# Patient Record
Sex: MALE | Race: WHITE | NOT HISPANIC OR LATINO | Employment: UNEMPLOYED | URBAN - METROPOLITAN AREA
[De-identification: names, ages, dates, MRNs, and addresses within clinical notes are randomized per-mention and may not be internally consistent; named-entity substitution may affect disease eponyms.]

---
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
## 2021-04-20 ENCOUNTER — TELEPHONE (OUTPATIENT)
Dept: OTHER | Facility: OTHER | Age: 21
End: 2021-04-20

## 2021-04-30 ENCOUNTER — IMMUNIZATIONS (OUTPATIENT)
Dept: FAMILY MEDICINE CLINIC | Facility: HOSPITAL | Age: 21
End: 2021-04-30

## 2021-04-30 DIAGNOSIS — Z23 ENCOUNTER FOR IMMUNIZATION: Primary | ICD-10-CM

## 2021-04-30 PROCEDURE — 0001A SARS-COV-2 / COVID-19 MRNA VACCINE (PFIZER-BIONTECH) 30 MCG: CPT

## 2021-04-30 PROCEDURE — 91300 SARS-COV-2 / COVID-19 MRNA VACCINE (PFIZER-BIONTECH) 30 MCG: CPT

## 2021-05-26 ENCOUNTER — IMMUNIZATIONS (OUTPATIENT)
Dept: FAMILY MEDICINE CLINIC | Facility: HOSPITAL | Age: 21
End: 2021-05-26

## 2021-05-26 DIAGNOSIS — Z23 ENCOUNTER FOR IMMUNIZATION: Primary | ICD-10-CM

## 2021-05-26 PROCEDURE — 0002A SARS-COV-2 / COVID-19 MRNA VACCINE (PFIZER-BIONTECH) 30 MCG: CPT

## 2021-05-26 PROCEDURE — 91300 SARS-COV-2 / COVID-19 MRNA VACCINE (PFIZER-BIONTECH) 30 MCG: CPT

## 2022-02-12 ENCOUNTER — IMMUNIZATIONS (OUTPATIENT)
Dept: FAMILY MEDICINE CLINIC | Facility: HOSPITAL | Age: 22
End: 2022-02-12

## 2022-02-12 PROCEDURE — 0051A COVID-19 PFIZER VACC TRIS-SUCROSE GRAY CAP 0.3 ML: CPT

## 2022-02-12 PROCEDURE — 91305 COVID-19 PFIZER VACC TRIS-SUCROSE GRAY CAP 0.3 ML: CPT

## 2022-02-24 ENCOUNTER — OFFICE VISIT (OUTPATIENT)
Dept: FAMILY MEDICINE CLINIC | Facility: CLINIC | Age: 22
End: 2022-02-24
Payer: COMMERCIAL

## 2022-02-24 VITALS
BODY MASS INDEX: 19.62 KG/M2 | RESPIRATION RATE: 16 BRPM | WEIGHT: 125 LBS | HEIGHT: 67 IN | HEART RATE: 85 BPM | OXYGEN SATURATION: 99 % | TEMPERATURE: 99.1 F | SYSTOLIC BLOOD PRESSURE: 108 MMHG | DIASTOLIC BLOOD PRESSURE: 72 MMHG

## 2022-02-24 DIAGNOSIS — Z00.00 ANNUAL PHYSICAL EXAM: Primary | ICD-10-CM

## 2022-02-24 DIAGNOSIS — J45.30 MILD PERSISTENT ASTHMA WITHOUT COMPLICATION: ICD-10-CM

## 2022-02-24 DIAGNOSIS — N50.812 TESTICULAR PAIN, LEFT: ICD-10-CM

## 2022-02-24 DIAGNOSIS — R07.9 CHEST PAIN, UNSPECIFIED TYPE: ICD-10-CM

## 2022-02-24 DIAGNOSIS — Z13.220 SCREENING, LIPID: ICD-10-CM

## 2022-02-24 DIAGNOSIS — Z13.21 ENCOUNTER FOR VITAMIN DEFICIENCY SCREENING: ICD-10-CM

## 2022-02-24 DIAGNOSIS — Z13.89 SCREENING FOR GENITOURINARY CONDITION: ICD-10-CM

## 2022-02-24 DIAGNOSIS — Z13.29 SCREENING FOR THYROID DISORDER: ICD-10-CM

## 2022-02-24 DIAGNOSIS — Z13.1 SCREENING FOR DIABETES MELLITUS: ICD-10-CM

## 2022-02-24 PROCEDURE — 1036F TOBACCO NON-USER: CPT | Performed by: INTERNAL MEDICINE

## 2022-02-24 PROCEDURE — 3008F BODY MASS INDEX DOCD: CPT | Performed by: INTERNAL MEDICINE

## 2022-02-24 PROCEDURE — 99395 PREV VISIT EST AGE 18-39: CPT | Performed by: INTERNAL MEDICINE

## 2022-02-24 NOTE — PATIENT INSTRUCTIONS

## 2022-02-24 NOTE — PROGRESS NOTES
ADULT ANNUAL [address] [facility] PRIMARY CARE [facility]    NAME: Isabel Ivey  AGE: 22 y o  SEX: male  : 2000     DATE: 2022     Assessment and Plan:     Problem List Items Addressed This Visit        Respiratory    Asthma, mild persistent     Stable p r n  Albuterol chest x-ray ordered           Other Visit Diagnoses     Annual physical exam    -  Primary    Testicular pain, left        Relevant Orders    US scrotum and groin area    Chlamydia/GC amplified DNA by PCR    Chest pain, unspecified type        Relevant Orders    XR chest pa & lateral    Screening, lipid        Relevant Orders    Lipid panel    Screening for thyroid disorder        Relevant Orders    TSH, 3rd generation    Encounter for vitamin deficiency screening        Relevant Orders    Vitamin D Panel    Screening for genitourinary condition        Relevant Orders    UA (URINE) with reflex to Scope    Screening for diabetes mellitus        Relevant Orders    Comprehensive metabolic panel    CBC and differential          Immunizations and preventive care screenings were discussed with patient today  Appropriate education was printed on patient's after visit summary  Counseling:  · Smoking         No follow-ups on file  Chief Complaint:     Chief Complaint   Patient presents with    Annual Physical    Groin Pain     hurts to touch     Chest pains     Current smoker     burning while peeing      History of Present Illness:     Adult Annual Physical   Patient here for a comprehensive physical exam  The patient reports problems - See below  Patient reports having intermittent left testicular discomfort associated with some burning with urination going on for last 2 months  Not been anybody new  Denies any penile discharge  Denies any fevers and chills  Denies any trauma  Currently his not having any discomfort    Is also experiencing some chest discomfort and quit smoking several months ago  He now is smoking marijuana  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 [facility]  Diet and Physical Activity  · Diet/Nutrition: regular  · Exercise: Active lifestyle  Depression Screening  PHQ-2/9 Depression Screening         General Health  · Sleep: sleeps well  · Hearing: normal - bilateral   · Vision: no vision problems  · Dental: regular dental visits  [facility]  · History of STDs?: no   Not known   Review of Systems:     Review of Systems   Constitutional: Negative for chills and fever  Respiratory:        As above   Cardiovascular:        As above   Gastrointestinal: Negative for abdominal pain, blood in stool, constipation and diarrhea  Genitourinary:        As above   Psychiatric/Behavioral:        Reports his mood to be stable  Not currently any medication      Past Medical History:     Past Medical History:   Diagnosis Date    ADHD (attention deficit hyperactivity disorder)     Allergic       Past Surgical History:     History reviewed  No pertinent surgical history     Social History:     Social History     Socioeconomic History    Marital status: Single     Spouse name: None    Number of children: None    Years of education: None    Highest education level: None   Occupational History    None   Tobacco Use    Smoking status: Former Smoker     Types: Cigarettes     Quit date: 2022     Years since quittin 0    Smokeless tobacco: Never Used   Vaping Use    Vaping Use: None   Substance and Sexual Activity    Alcohol use: None    Drug use: Yes     Types: Marijuana     Comment: Everyday     Sexual activity: None   Other Topics Concern    None   Social History Narrative    None     Social Determinants of Health     Financial Resource Strain: Not on file   Food Insecurity: Not on file   Transportation Needs: Not on file   Physical Activity: Not on file   Stress: Not on file   Social Connections: Not on file Intimate Partner Violence: Not on file   Housing Stability: Not on file      Family History:     Family History   Problem Relation Age of Onset    Diabetes Mother     Kidney failure Mother     Blindness Mother     Depression Mother     No Known Problems Father     Depression Maternal Grandmother     Substance Abuse Neg Hx       Current Medications:     Current Outpatient Medications   Medication Sig Dispense Refill    albuterol (PROAIR HFA) 90 mcg/act inhaler Inhale 2 puffs 4 (four) times a day 1 Inhaler 3    loratadine (CLARITIN) 10 mg tablet Take 1 tablet by mouth daily      mometasone (NASONEX) 50 mcg/act nasal spray 2 sprays into each nostril daily      montelukast (SINGULAIR) 10 mg tablet Take 1 tablet (10 mg total) by mouth daily (Patient not taking: Reported on 2/24/2022 ) 30 tablet 3    sertraline (ZOLOFT) 25 mg tablet TAKE 1 TABLET BY MOUTH EVERY DAY (Patient not taking: Reported on 2/24/2022) 90 tablet 1     No current facility-administered medications for this visit  Allergies: Allergies   Allergen Reactions    Sulfa Antibiotics Anaphylaxis, Hives and Vomiting    Pollen Extract      Annotation - [id number]: runny nose, congestion      Physical Exam:     /72 (BP Location: Left arm, Patient Position: Sitting, Cuff Size: Standard)   Pulse 85   Temp 99 1 °F (37 3 °C) (Tympanic)   Resp 16   Ht 5' 7" (1 702 m)   Wt 56 7 kg (125 lb)   SpO2 99%   BMI 19 58 kg/m²     Physical Exam  Vitals and nursing note reviewed  Constitutional:       Appearance: He is well-developed  HENT:      Head: Normocephalic and atraumatic  Eyes:      Conjunctiva/sclera: Conjunctivae normal    Cardiovascular:      Rate and Rhythm: Normal rate and regular rhythm  Heart sounds: No murmur heard  Pulmonary:      Effort: Pulmonary effort is normal  No respiratory distress  Breath sounds: Normal breath sounds  Abdominal:      General: Bowel sounds are normal  There is no distension  Palpations: Abdomen is soft  Tenderness: There is no abdominal tenderness  There is no right CVA tenderness, left CVA tenderness or guarding  Genitourinary:     Penis: Normal        Testes: Normal       Comments: No hernias appreciated  Musculoskeletal:      Cervical back: Neck supple  Skin:     General: Skin is warm and dry  Neurological:      General: No focal deficit present  Mental Status: He is alert and oriented to person, place, and time     Psychiatric:         Mood and Affect: Mood normal           Bev Smith MD   [address]

## 2022-03-08 ENCOUNTER — OFFICE VISIT (OUTPATIENT)
Dept: FAMILY MEDICINE CLINIC | Facility: CLINIC | Age: 22
End: 2022-03-08
Payer: COMMERCIAL

## 2022-03-08 VITALS
OXYGEN SATURATION: 99 % | HEART RATE: 84 BPM | DIASTOLIC BLOOD PRESSURE: 68 MMHG | TEMPERATURE: 100.1 F | BODY MASS INDEX: 20.09 KG/M2 | HEIGHT: 67 IN | SYSTOLIC BLOOD PRESSURE: 126 MMHG | WEIGHT: 128 LBS

## 2022-03-08 DIAGNOSIS — Z20.2 EXPOSURE TO STD: ICD-10-CM

## 2022-03-08 DIAGNOSIS — Z11.4 SCREENING FOR HIV (HUMAN IMMUNODEFICIENCY VIRUS): Primary | ICD-10-CM

## 2022-03-08 DIAGNOSIS — Z20.2 EXPOSURE TO STD: Primary | ICD-10-CM

## 2022-03-08 DIAGNOSIS — N50.812 TESTICULAR PAIN, LEFT: ICD-10-CM

## 2022-03-08 DIAGNOSIS — R30.0 DYSURIA: ICD-10-CM

## 2022-03-08 DIAGNOSIS — Z11.59 NEED FOR HEPATITIS C SCREENING TEST: ICD-10-CM

## 2022-03-08 LAB
SL AMB  POCT GLUCOSE, UA: ABNORMAL
SL AMB LEUKOCYTE ESTERASE,UA: ABNORMAL
SL AMB POCT BILIRUBIN,UA: ABNORMAL
SL AMB POCT BLOOD,UA: POSITIVE
SL AMB POCT CLARITY,UA: CLEAR
SL AMB POCT COLOR,UA: YELLOW
SL AMB POCT KETONES,UA: ABNORMAL
SL AMB POCT NITRITE,UA: ABNORMAL
SL AMB POCT PH,UA: POSITIVE
SL AMB POCT SPECIFIC GRAVITY,UA: POSITIVE
SL AMB POCT URINE PROTEIN: POSITIVE
SL AMB POCT UROBILINOGEN: ABNORMAL

## 2022-03-08 PROCEDURE — 81002 URINALYSIS NONAUTO W/O SCOPE: CPT | Performed by: INTERNAL MEDICINE

## 2022-03-08 PROCEDURE — 3008F BODY MASS INDEX DOCD: CPT | Performed by: INTERNAL MEDICINE

## 2022-03-08 PROCEDURE — 87591 N.GONORRHOEAE DNA AMP PROB: CPT | Performed by: INTERNAL MEDICINE

## 2022-03-08 PROCEDURE — 99213 OFFICE O/P EST LOW 20 MIN: CPT | Performed by: INTERNAL MEDICINE

## 2022-03-08 PROCEDURE — 1036F TOBACCO NON-USER: CPT | Performed by: INTERNAL MEDICINE

## 2022-03-08 PROCEDURE — 3725F SCREEN DEPRESSION PERFORMED: CPT | Performed by: INTERNAL MEDICINE

## 2022-03-08 PROCEDURE — 87491 CHLMYD TRACH DNA AMP PROBE: CPT | Performed by: INTERNAL MEDICINE

## 2022-03-08 RX ORDER — LEVOFLOXACIN 500 MG/1
500 TABLET, FILM COATED ORAL EVERY 24 HOURS
Qty: 1 TABLET | Refills: 0 | Status: SHIPPED | OUTPATIENT
Start: 2022-03-08 | End: 2022-03-09

## 2022-03-08 RX ORDER — CIPROFLOXACIN 500 MG/1
500 TABLET, FILM COATED ORAL DAILY
Qty: 1 TABLET | Refills: 0 | Status: SHIPPED | OUTPATIENT
Start: 2022-03-08 | End: 2022-03-08 | Stop reason: RX

## 2022-03-08 RX ORDER — DOXYCYCLINE HYCLATE 100 MG
100 TABLET ORAL 2 TIMES DAILY
Qty: 14 TABLET | Refills: 0 | Status: SHIPPED | OUTPATIENT
Start: 2022-03-08 | End: 2022-03-15

## 2022-03-08 NOTE — ADDENDUM NOTE
Addended by: Kimberli Whipple on: 3/8/2022 04:42 PM     Modules accepted: Orders Scalpel Size: 15 blade

## 2022-03-08 NOTE — PROGRESS NOTES
Assessment/Plan:           Problem List Items Addressed This Visit     None      Visit Diagnoses     Screening for HIV (human immunodeficiency virus)    -  Primary    Relevant Orders    HIV 1/2 Antigen/Antibody (4th Generation) w Reflex [facility]    Dysuria        Testicular pain, left        Relevant Orders    US scrotum and testicles    Exposure to STD        Relevant Medications    ciprofloxacin (CIPRO) 500 mg tablet    doxycycline hyclate (VIBRA-TABS) 100 mg tablet    Need for hepatitis C screening test        Relevant Orders    Hepatitis C Antibody ([facility])            Subjective:      Patient ID: Cleopatra De Leon is a 22 y o  male  HPI  Patient is here for an acute visit complaining of dysuria is been going on for several days now  Patient was here couple of weeks in labs and scrotal ultrasound was ordered which patient has not carried out stating that he is to be seen  Urine dip in the office shows 1+ blood with no LE or nitrates  Patient does admit to having unprotected sex recently  We would in the urine out for GC and chlamydia  I will start antibiotic  Testicular ultrasound will be scheduled for him  Patient will go for lab studies tomorrow  The following portions of the patient's history were reviewed and updated as appropriate: allergies, current medications, past family history, past medical history, past social history, past surgical history and problem list     Review of Systems      Objective:      /68 (BP Location: Left arm, Patient Position: Sitting, Cuff Size: Standard)   Pulse 84   Temp 100 1 °F (37 8 °C) (Tympanic)   Ht 5' 7" (1 702 m)   Wt 58 1 kg (128 lb)   SpO2 99%   BMI 20 05 kg/m²          Physical Exam  Abdominal:      Tenderness: There is no right CVA tenderness or left CVA tenderness

## 2022-03-09 ENCOUNTER — TELEPHONE (OUTPATIENT)
Dept: FAMILY MEDICINE CLINIC | Facility: CLINIC | Age: 22
End: 2022-03-09

## 2022-03-09 RX ORDER — CIPROFLOXACIN 500 MG/1
500 TABLET, FILM COATED ORAL DAILY
Qty: 1 TABLET | Refills: 0 | Status: SHIPPED | OUTPATIENT
Start: 2022-03-09 | End: 2022-03-09

## 2022-03-09 NOTE — TELEPHONE ENCOUNTER
Pt  Called asking about the CIPRO medication asking if he should be filling it or not since he received a call from the pharmacy

## 2022-03-10 LAB
C TRACH DNA SPEC QL NAA+PROBE: NEGATIVE
N GONORRHOEA DNA SPEC QL NAA+PROBE: NEGATIVE

## 2022-08-08 ENCOUNTER — OFFICE VISIT (OUTPATIENT)
Dept: FAMILY MEDICINE CLINIC | Facility: CLINIC | Age: 22
End: 2022-08-08
Payer: COMMERCIAL

## 2022-08-08 ENCOUNTER — TELEPHONE (OUTPATIENT)
Dept: FAMILY MEDICINE CLINIC | Facility: CLINIC | Age: 22
End: 2022-08-08

## 2022-08-08 VITALS
BODY MASS INDEX: 19.74 KG/M2 | DIASTOLIC BLOOD PRESSURE: 68 MMHG | RESPIRATION RATE: 16 BRPM | HEIGHT: 67 IN | TEMPERATURE: 98.1 F | SYSTOLIC BLOOD PRESSURE: 122 MMHG | WEIGHT: 125.8 LBS | HEART RATE: 104 BPM | OXYGEN SATURATION: 98 %

## 2022-08-08 DIAGNOSIS — H92.03 OTALGIA OF BOTH EARS: Primary | ICD-10-CM

## 2022-08-08 DIAGNOSIS — R07.0 THROAT PAIN: ICD-10-CM

## 2022-08-08 DIAGNOSIS — D23.4 CYST, DERMOID, SCALP AND NECK: ICD-10-CM

## 2022-08-08 PROBLEM — Z87.891 FORMER SMOKER: Status: ACTIVE | Noted: 2022-08-08

## 2022-08-08 PROCEDURE — 3725F SCREEN DEPRESSION PERFORMED: CPT | Performed by: PHYSICIAN ASSISTANT

## 2022-08-08 PROCEDURE — 99214 OFFICE O/P EST MOD 30 MIN: CPT | Performed by: PHYSICIAN ASSISTANT

## 2022-08-08 NOTE — PROGRESS NOTES
Assessment/Plan:    -I did advise him to avoid any form of tobacco especially with his fears of developing throat cancer  -I did assure him that I did not find anything concerning on exam today   -I did advise him that if the cyst on the posterior neck gets any bigger or he develops pain he needs a follow-up evaluation   -I did encourage him to proceed with the lab orders from Dr Cristal Sharif from February and March  -I did recommend he follow-up with Dr Cristal Sharif as recommended otherwise    M*Modal software was used to dictate this note  It may contain errors with dictating incorrect words/spelling  Please contact provider directly for any questions  Diagnoses and all orders for this visit:    Otalgia of both ears    Throat pain    Cyst, dermoid, scalp and neck          Subjective:      Patient ID: Mook Cook is a 22 y o  male  Patient presents today because he has been noticing some ear discomfort off and on for the past 2-3 weeks  He does not have any pain in his ears today  He states he does have a tendency to be a hypochondriac and he worries that he is going to developed cancer because of smoking cigarettes  He states he has quit smoking but he is utilizing some other type of product that may have tobacco   He does notice some throat discomfort low in his throat when he swallows but he is not having any dysphagia  He states he has been sneezing off and on  He denies any nasal congestion, ear discharge, fever, chills, cough, jaw pain or tooth pain  He states he gets tested regularly for COVID 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  He also has a lump on the back of his neck that is been there for a while with no change in size and denies any pain  The following portions of the patient's history were reviewed and updated as appropriate:   He  has a past medical history of ADHD (attention deficit hyperactivity disorder) and Allergic    He   Patient Active Problem List    Diagnosis Date Noted    Former smoker 08/08/2022    Otalgia of both ears 08/08/2022    Throat pain 08/08/2022    Cyst, dermoid, scalp and neck 08/08/2022    Microscopic hematuria 12/15/2014    Proteinuria 12/15/2014    Asthma, mild persistent 07/02/2014    Attention-deficit/hyperactivity disorder 07/02/2014    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     He  has no past surgical history on file  His family history includes Blindness in his mother; Depression in his maternal grandmother and mother; Diabetes in his mother; Kidney failure in his mother; No Known Problems in his father  He  reports that he quit smoking about 5 months ago  His smoking use included cigarettes  He has never used smokeless tobacco  He reports current alcohol use  He reports current drug use  Drug: Marijuana  Current Outpatient Medications   Medication Sig Dispense Refill    albuterol (PROAIR HFA) 90 mcg/act inhaler Inhale 2 puffs 4 (four) times a day (Patient taking differently: Inhale 2 puffs every 4 (four) hours as needed) 1 Inhaler 3    loratadine (CLARITIN) 10 mg tablet Take 1 tablet by mouth daily      mometasone (NASONEX) 50 mcg/act nasal spray 2 sprays into each nostril daily (Patient not taking: No sig reported)      montelukast (SINGULAIR) 10 mg tablet Take 1 tablet (10 mg total) by mouth daily (Patient not taking: No sig reported) 30 tablet 3    sertraline (ZOLOFT) 25 mg tablet TAKE 1 TABLET BY MOUTH EVERY DAY (Patient not taking: No sig reported) 90 tablet 1     No current facility-administered medications for this visit       Current Outpatient Medications on File Prior to Visit   Medication Sig    albuterol (PROAIR HFA) 90 mcg/act inhaler Inhale 2 puffs 4 (four) times a day (Patient taking differently: Inhale 2 puffs every 4 (four) hours as needed)    loratadine (CLARITIN) 10 mg tablet Take 1 tablet by mouth daily    mometasone (NASONEX) 50 mcg/act nasal spray 2 sprays into each nostril daily (Patient not taking: No sig reported)    montelukast (SINGULAIR) 10 mg tablet Take 1 tablet (10 mg total) by mouth daily (Patient not taking: No sig reported)    sertraline (ZOLOFT) 25 mg tablet TAKE 1 TABLET BY MOUTH EVERY DAY (Patient not taking: No sig reported)     No current facility-administered medications on file prior to visit  He is allergic to sulfa antibiotics and pollen extract       Review of Systems   Constitutional: Negative for chills and fever  HENT: Positive for ear discharge, postnasal drip and sore throat  Negative for congestion  Respiratory: Negative for cough and shortness of breath  Objective:      /68 (BP Location: Left arm, Patient Position: Sitting, Cuff Size: Standard)   Pulse 104   Temp 98 1 °F (36 7 °C) (Tympanic)   Resp 16   Ht 5' 7" (1 702 m)   Wt 57 1 kg (125 lb 12 8 oz)   SpO2 98%   BMI 19 70 kg/m²          Physical Exam  Vitals reviewed  Constitutional:       General: He is not in acute distress  Appearance: Normal appearance  He is not ill-appearing, toxic-appearing or diaphoretic  HENT:      Head: Normocephalic and atraumatic  Right Ear: Tympanic membrane, ear canal and external ear normal  There is no impacted cerumen  Left Ear: Tympanic membrane, ear canal and external ear normal  There is no impacted cerumen  Nose: Nose normal       Mouth/Throat:      Mouth: Mucous membranes are moist       Pharynx: No oropharyngeal exudate or posterior oropharyngeal erythema  Neck:      Comments: Posterior neck at the base of the hairline he does have a palpable 0 5 cm mobile subcutaneous nontender mass  No erythema  Cardiovascular:      Rate and Rhythm: Normal rate and regular rhythm  Heart sounds: No murmur heard  Pulmonary:      Effort: Pulmonary effort is normal  No respiratory distress  Breath sounds: Normal breath sounds  No wheezing, rhonchi or rales  Musculoskeletal:      Cervical back: Neck supple  Neurological:      General: No focal deficit present  Mental Status: He is alert  Psychiatric:         Mood and Affect: Mood normal          Behavior: Behavior normal          Thought Content:  Thought content normal          Judgment: Judgment normal

## 2022-08-12 LAB
EXTERNAL HIV SCREEN: NORMAL
HCV AB SER-ACNC: POSITIVE

## 2022-08-17 DIAGNOSIS — R76.8 HEPATITIS C ANTIBODY POSITIVE IN BLOOD: Primary | ICD-10-CM

## 2022-08-22 ENCOUNTER — OFFICE VISIT (OUTPATIENT)
Dept: FAMILY MEDICINE CLINIC | Facility: CLINIC | Age: 22
End: 2022-08-22
Payer: COMMERCIAL

## 2022-08-22 ENCOUNTER — APPOINTMENT (OUTPATIENT)
Dept: LAB | Facility: MEDICAL CENTER | Age: 22
End: 2022-08-22
Payer: COMMERCIAL

## 2022-08-22 VITALS
RESPIRATION RATE: 16 BRPM | BODY MASS INDEX: 19.46 KG/M2 | HEIGHT: 67 IN | HEART RATE: 74 BPM | TEMPERATURE: 98 F | SYSTOLIC BLOOD PRESSURE: 110 MMHG | WEIGHT: 124 LBS | OXYGEN SATURATION: 98 % | DIASTOLIC BLOOD PRESSURE: 68 MMHG

## 2022-08-22 DIAGNOSIS — R76.8 HEPATITIS C ANTIBODY POSITIVE IN BLOOD: Primary | ICD-10-CM

## 2022-08-22 DIAGNOSIS — R76.8 HEPATITIS C ANTIBODY POSITIVE IN BLOOD: ICD-10-CM

## 2022-08-22 LAB
HBV SURFACE AB SER-ACNC: 10.07 MIU/ML
HBV SURFACE AG SER QL: NORMAL
RPR SER QL: NORMAL

## 2022-08-22 PROCEDURE — 86592 SYPHILIS TEST NON-TREP QUAL: CPT

## 2022-08-22 PROCEDURE — 86706 HEP B SURFACE ANTIBODY: CPT

## 2022-08-22 PROCEDURE — 87340 HEPATITIS B SURFACE AG IA: CPT

## 2022-08-22 PROCEDURE — 99213 OFFICE O/P EST LOW 20 MIN: CPT | Performed by: INTERNAL MEDICINE

## 2022-08-22 NOTE — PROGRESS NOTES
Assessment/Plan:           Problem List Items Addressed This Visit    None     Visit Diagnoses     Hepatitis C antibody positive in blood    -  Primary    Relevant Orders    RPR    Hepatitis B surface antibody    Hepatitis B surface antigen        see discussion above    Subjective:      Patient ID: Ashlee Robison is a 22 y o  male  HPI  Patient is here to follow-up on recent lab studies  Hepatitis-C screen was positive however no detectable viral load was seen  Liver enzymes were normal   HIV was negative  Possibility of false positive test or a resolved infection It could be concluded  At this point I would repeat hepatitis-C profile as well as hepatitis-B to be sure as patient is young  He has snorted cocaine and has smoked marijuana in the past but is not doing it now  Patient denies any IV drug use  He does not use alcohol is only 21  Counseling provided when he turns 22  Safe sex practices discussed again  Patient had complained about testicular discomfort  Ultrasound patient was done in [facility] showed small left hydrocele but no other concerns were found  Patient has been seen in [facility] as well thank you exercises were recommended  Urine test for GC and chlamydia in the past       The following portions of the patient's history were reviewed and updated as appropriate: allergies, current medications, past medical history, past social history, past surgical history and problem list     Review of Systems      Objective:      /68 (BP Location: Left arm, Patient Position: Sitting, Cuff Size: Standard)   Pulse 74   Temp 98 °F (36 7 °C) (Tympanic)   Resp 16   Ht 5' 7" (1 702 m)   Wt 56 2 kg (124 lb)   SpO2 98%   BMI 19 42 kg/m²          Physical Exam  Eyes:      Conjunctiva/sclera: Conjunctivae normal    Abdominal:      General: There is no distension  Tenderness: There is no abdominal tenderness  There is no guarding     Skin:     Coloration: Skin is not jaundiced  Neurological:      Mental Status: He is alert

## 2022-08-23 ENCOUNTER — APPOINTMENT (OUTPATIENT)
Dept: LAB | Facility: MEDICAL CENTER | Age: 22
End: 2022-08-23
Payer: COMMERCIAL

## 2022-08-23 DIAGNOSIS — R76.8 HEPATITIS C ANTIBODY POSITIVE IN BLOOD: ICD-10-CM

## 2022-08-23 PROCEDURE — 86803 HEPATITIS C AB TEST: CPT

## 2022-08-29 ENCOUNTER — TELEPHONE (OUTPATIENT)
Dept: FAMILY MEDICINE CLINIC | Facility: CLINIC | Age: 22
End: 2022-08-29

## 2022-11-11 ENCOUNTER — PREPPED CHART (OUTPATIENT)
Dept: URBAN - METROPOLITAN AREA CLINIC 6 | Facility: CLINIC | Age: 22
End: 2022-11-11

## 2022-11-11 ENCOUNTER — ESTABLISHED COMPREHENSIVE EXAM (OUTPATIENT)
Dept: URBAN - METROPOLITAN AREA CLINIC 6 | Facility: CLINIC | Age: 22
End: 2022-11-11

## 2022-11-11 DIAGNOSIS — Z01.00: ICD-10-CM

## 2022-11-11 DIAGNOSIS — H52.03: ICD-10-CM

## 2022-11-11 PROCEDURE — 92015 DETERMINE REFRACTIVE STATE: CPT

## 2022-11-11 PROCEDURE — 92310 CONTACT LENS FITTING OU: CPT

## 2022-11-11 PROCEDURE — 92014 COMPRE OPH EXAM EST PT 1/>: CPT

## 2022-11-11 ASSESSMENT — TONOMETRY
OD_IOP_MMHG: 14
OS_IOP_MMHG: 13

## 2022-11-11 ASSESSMENT — KERATOMETRY
OS_K1POWER_DIOPTERS: 43.50
OS_AXISANGLE2_DEGREES: 108
OS_K2POWER_DIOPTERS: 44.25
OD_K2POWER_DIOPTERS: 44.00
OD_AXISANGLE2_DEGREES: 80
OD_AXISANGLE_DEGREES: 170
OS_AXISANGLE_DEGREES: 18
OD_K1POWER_DIOPTERS: 43.00

## 2022-11-11 ASSESSMENT — VISUAL ACUITY
OD_CC: 20/25
OS_CC: 20/25

## 2023-11-03 ENCOUNTER — OFFICE VISIT (OUTPATIENT)
Dept: FAMILY MEDICINE CLINIC | Facility: CLINIC | Age: 23
End: 2023-11-03
Payer: COMMERCIAL

## 2023-11-03 VITALS
HEIGHT: 67 IN | DIASTOLIC BLOOD PRESSURE: 74 MMHG | WEIGHT: 129 LBS | TEMPERATURE: 98.5 F | BODY MASS INDEX: 20.25 KG/M2 | OXYGEN SATURATION: 100 % | HEART RATE: 99 BPM | SYSTOLIC BLOOD PRESSURE: 118 MMHG

## 2023-11-03 DIAGNOSIS — Z13.220 SCREENING, LIPID: ICD-10-CM

## 2023-11-03 DIAGNOSIS — Z13.29 SCREENING FOR THYROID DISORDER: ICD-10-CM

## 2023-11-03 DIAGNOSIS — Z13.89 SCREENING FOR GENITOURINARY CONDITION: ICD-10-CM

## 2023-11-03 DIAGNOSIS — Z00.00 ANNUAL PHYSICAL EXAM: Primary | ICD-10-CM

## 2023-11-03 DIAGNOSIS — Z13.21 ENCOUNTER FOR VITAMIN DEFICIENCY SCREENING: ICD-10-CM

## 2023-11-03 DIAGNOSIS — Z23 ENCOUNTER FOR IMMUNIZATION: ICD-10-CM

## 2023-11-03 DIAGNOSIS — Z13.1 SCREENING FOR DIABETES MELLITUS: ICD-10-CM

## 2023-11-03 PROCEDURE — 99395 PREV VISIT EST AGE 18-39: CPT | Performed by: INTERNAL MEDICINE

## 2023-11-03 PROCEDURE — 90686 IIV4 VACC NO PRSV 0.5 ML IM: CPT

## 2023-11-03 PROCEDURE — 90471 IMMUNIZATION ADMIN: CPT

## 2023-11-03 PROCEDURE — 90715 TDAP VACCINE 7 YRS/> IM: CPT

## 2023-11-03 PROCEDURE — 90472 IMMUNIZATION ADMIN EACH ADD: CPT

## 2023-11-03 NOTE — PATIENT INSTRUCTIONS
Wellness Visit for Adults   AMBULATORY CARE:   A wellness visit  is when you see your healthcare provider to get screened for health problems. Your healthcare provider will also give you advice on how to stay healthy. Write down your questions so you remember to ask them. Ask your healthcare provider how often you should have a wellness visit. What happens at a wellness visit:  Your healthcare provider will ask about your health, and your family history of health problems. This includes high blood pressure, heart disease, and cancer. He or she will ask if you have symptoms that concern you, if you smoke, and about your mood. You may also be asked about your intake of medicines, supplements, food, and alcohol. Any of the following may be done: Your weight  will be checked. Your height may also be checked so your body mass index (BMI) can be calculated. Your BMI shows if you are at a healthy weight. Your blood pressure  and heart rate will be checked. Your temperature may also be checked. Blood and urine tests  may be done. Blood tests may be done to check your cholesterol levels. Abnormal cholesterol levels increase your risk for heart disease and stroke. You may also need a blood or urine test to check for diabetes if you are at increased risk. Urine tests may be done to look for signs of an infection or kidney disease. A physical exam  includes checking your heartbeat and lungs with a stethoscope. Your healthcare provider may also check your skin to look for sun damage. Screening tests  may be recommended. A screening test is done to check for diseases that may not cause symptoms. The screening tests you may need depend on your age, gender, family history, and lifestyle habits. For example, colorectal screening may be recommended if you are 48years old or older. Screening tests you need if you are a woman:   A Pap smear  is used to screen for cervical cancer.  Pap smears are usually done every 3 to 5 years depending on your age. You may need them more often if you have had abnormal Pap smear test results in the past. Ask your healthcare provider how often you should have a Pap smear. A mammogram  is an x-ray of your breasts to screen for breast cancer. Experts recommend mammograms every 2 years starting at age 48 years. You may need a mammogram at age 52 years or younger if you have an increased risk for breast cancer. Talk to your healthcare provider about when you should start having mammograms and how often you need them. Vaccines you may need:   Get an influenza vaccine  every year. The influenza vaccine protects you from the flu. Several types of viruses cause the flu. The viruses change over time, so new vaccines are made each year. Get a tetanus-diphtheria (Td) booster vaccine  every 10 years. This vaccine protects you against tetanus and diphtheria. Tetanus is a severe infection that may cause painful muscle spasms and lockjaw. Diphtheria is a severe bacterial infection that causes a thick covering in the back of your mouth and throat. Get a human papillomavirus (HPV) vaccine  if you are female and aged 23 to 32 or male 23 to 24 and never received it. This vaccine protects you from HPV infection. HPV is the most common infection spread by sexual contact. HPV may also cause vaginal, penile, and anal cancers. Get a pneumococcal vaccine  if you are aged 72 years or older. The pneumococcal vaccine is an injection given to protect you from pneumococcal disease. Pneumococcal disease is an infection caused by pneumococcal bacteria. The infection may cause pneumonia, meningitis, or an ear infection. Get a shingles vaccine  if you are 60 or older, even if you have had shingles before. The shingles vaccine is an injection to protect you from the varicella-zoster virus. This is the same virus that causes chickenpox.  Shingles is a painful rash that develops in people who had chickenpox or have been exposed to the virus. How to eat healthy:  My Plate is a model for planning healthy meals. It shows the types and amounts of foods that should go on your plate. Fruits and vegetables make up about half of your plate, and grains and protein make up the other half. A serving of dairy is included on the side of your plate. The amount of calories and serving sizes you need depends on your age, gender, weight, and height. Examples of healthy foods are listed below:  Eat a variety of vegetables  such as dark green, red, and orange vegetables. You can also include canned vegetables low in sodium (salt) and frozen vegetables without added butter or sauces. Eat a variety of fresh fruits , canned fruit in 100% juice, frozen fruit, and dried fruit. Include whole grains. At least half of the grains you eat should be whole grains. Examples include whole-wheat bread, wheat pasta, brown rice, and whole-grain cereals such as oatmeal.    Eat a variety of protein foods such as seafood (fish and shellfish), lean meat, and poultry without skin (turkey and chicken). Examples of lean meats include pork leg, shoulder, or tenderloin, and beef round, sirloin, tenderloin, and extra lean ground beef. Other protein foods include eggs and egg substitutes, beans, peas, soy products, nuts, and seeds. Choose low-fat dairy products such as skim or 1% milk or low-fat yogurt, cheese, and cottage cheese. Limit unhealthy fats  such as butter, hard margarine, and shortening. Exercise:  Exercise at least 30 minutes per day on most days of the week. Some examples of exercise include walking, biking, dancing, and swimming. You can also fit in more physical activity by taking the stairs instead of the elevator or parking farther away from stores. Include muscle strengthening activities 2 days each week. Regular exercise provides many health benefits.  It helps you manage your weight, and decreases your risk for type 2 diabetes, heart disease, stroke, and high blood pressure. Exercise can also help improve your mood. Ask your healthcare provider about the best exercise plan for you. General health and safety guidelines:   Do not smoke. Nicotine and other chemicals in cigarettes and cigars can cause lung damage. Ask your healthcare provider for information if you currently smoke and need help to quit. E-cigarettes or smokeless tobacco still contain nicotine. Talk to your healthcare provider before you use these products. Limit alcohol. A drink of alcohol is 12 ounces of beer, 5 ounces of wine, or 1½ ounces of liquor. Lose weight, if needed. Being overweight increases your risk of certain health conditions. These include heart disease, high blood pressure, type 2 diabetes, and certain types of cancer. Protect your skin. Do not sunbathe or use tanning beds. Use sunscreen with a SPF 15 or higher. Apply sunscreen at least 15 minutes before you go outside. Reapply sunscreen every 2 hours. Wear protective clothing, hats, and sunglasses when you are outside. Drive safely. Always wear your seatbelt. Make sure everyone in your car wears a seatbelt. A seatbelt can save your life if you are in an accident. Do not use your cell phone when you are driving. This could distract you and cause an accident. Pull over if you need to make a call or send a text message. Practice safe sex. Use latex condoms if are sexually active and have more than one partner. Your healthcare provider may recommend screening tests for sexually transmitted infections (STIs). Wear helmets, lifejackets, and protective gear. Always wear a helmet when you ride a bike or motorcycle, go skiing, or play sports that could cause a head injury. Wear protective equipment when you play sports. Wear a lifejacket when you are on a boat or doing water sports.     © Copyright Rachid Iyer 2023 Information is for End User's use only and may not be sold, redistributed or otherwise used for commercial purposes. The above information is an  only. It is not intended as medical advice for individual conditions or treatments. Talk to your doctor, nurse or pharmacist before following any medical regimen to see if it is safe and effective for you.

## 2023-11-03 NOTE — PROGRESS NOTES
ADULT ANNUAL PHYSICAL  [address] [facility] PRIMARY CARE [facility]    NAME: Franca Stringer  AGE: 25 y.o. SEX: male  : 2000     DATE: 11/3/2023     Assessment and Plan:     Problem List Items Addressed This Visit    None  Visit Diagnoses       Annual physical exam    -  Primary    Screening, lipid        Relevant Orders    CBC and differential    Comprehensive metabolic panel    Lipid panel    Screening for thyroid disorder        Relevant Orders    TSH, 3rd generation with Free T4 reflex    Screening for genitourinary condition        Relevant Orders    Urinalysis with microscopic    Encounter for vitamin deficiency screening        Relevant Orders    Vitamin D 25 hydroxy    Screening for diabetes mellitus                  Immunizations and preventive care screenings were discussed with patient today. Appropriate education was printed on patient's after visit summary. Counseling:  Health preventative screening      Depression Screening and Follow-up Plan: Patient's depression screening was positive with a PHQ-2 score of 6. Their PHQ-9 score was 10. No follow-ups on file. Chief Complaint:     Chief Complaint   Patient presents with    Physical Exam     Physical visit. Pt has no other concerns. LS      History of Present Illness:     Adult Annual Physical   Patient here for a comprehensive physical exam. The patient reports no problems. Patient is here for annual physical.  He is due for lab studies. He is in construction. He had his last tetanus vaccination . He will be given tetanus vaccination today. He is also due for flu vaccine. He is trying to cut back on smoking. He does drink alcohol but in moderation he states. He is discouraged from use of both. I strongly encouraged him to get blood work done given family history of diabetes mellitus. Diet and Physical Activity  Diet/Nutrition:  Regular .    Exercise: moderate cardiovascular exercise. Depression Screening  PHQ-2/9 Depression Screening    Little interest or pleasure in doing things: 3 - nearly every day  Feeling down, depressed, or hopeless: 3 - nearly every day  Trouble falling or staying asleep, or sleeping too much: 0 - not at all  Feeling tired or having little energy: 1 - several days  Poor appetite or overeatin - several days  Feeling bad about yourself - or that you are a failure or have let yourself or your family down: 2 - more than half the days  Trouble concentrating on things, such as reading the newspaper or watching television: 0 - not at all  Moving or speaking so slowly that other people could have noticed. Or the opposite - being so fidgety or restless that you have been moving around a lot more than usual: 0 - not at all  Thoughts that you would be better off dead, or of hurting yourself in some way: 0 - not at all  PHQ-2 Score: 6  PHQ-2 Interpretation: POSITIVE depression screen  PHQ-9 Score: 10   PHQ-9 Interpretation: Moderate depression          General Health  Sleep: sleeps well. Hearing: normal - bilateral.  Vision: no vision problems. Dental: brushes teeth twice daily.  Health  History of STDs?: no.    Advanced Care Planning  Do you have an advanced directive? no  Do you have a durable medical power of ? yes     Review of Systems:     Review of Systems   Past Medical History:     Past Medical History:   Diagnosis Date    ADHD (attention deficit hyperactivity disorder)     Allergic       Past Surgical History:     History reviewed. No pertinent surgical history.    Social History:     Social History     Socioeconomic History    Marital status: Single     Spouse name: None    Number of children: None    Years of education: None    Highest education level: None   Occupational History    None   Tobacco Use    Smoking status: Former     Types: Cigarettes     Quit date: 2022     Years since quittin.7    Smokeless tobacco: Never Vaping Use    Vaping Use: Never used   Substance and Sexual Activity    Alcohol use: Yes    Drug use: Yes     Types: Marijuana     Comment: Everyday     Sexual activity: Yes   Other Topics Concern    None   Social History Narrative    None     Social Determinants of Health     Financial Resource Strain: Not on file   Food Insecurity: Not on file   Transportation Needs: Not on file   Physical Activity: Not on file   Stress: Not on file   Social Connections: Not on file   Intimate Partner Violence: Not on file   Housing Stability: Not on file      Family History:     Family History   Problem Relation Age of Onset    Diabetes Mother     Kidney failure Mother     Blindness Mother     Depression Mother     No Known Problems Father     Depression Maternal Grandmother     Substance Abuse Neg Hx       Current Medications:     Current Outpatient Medications   Medication Sig Dispense Refill    albuterol (PROAIR HFA) 90 mcg/act inhaler Inhale 2 puffs 4 (four) times a day (Patient taking differently: Inhale 2 puffs every 4 (four) hours as needed) 1 Inhaler 3    loratadine (CLARITIN) 10 mg tablet Take 1 tablet by mouth daily      mometasone (NASONEX) 50 mcg/act nasal spray 2 sprays into each nostril daily (Patient not taking: Reported on 8/22/2022)      montelukast (SINGULAIR) 10 mg tablet Take 1 tablet (10 mg total) by mouth daily (Patient not taking: Reported on 8/22/2022) 30 tablet 3    sertraline (ZOLOFT) 25 mg tablet TAKE 1 TABLET BY MOUTH EVERY DAY (Patient not taking: Reported on 8/22/2022) 90 tablet 1     No current facility-administered medications for this visit. Allergies:      Allergies   Allergen Reactions    Sulfa Antibiotics Anaphylaxis, Hives and Vomiting    Pollen Extract      Annotation - [id number]: runny nose, congestion      Physical Exam:     /74 (BP Location: Left arm, Patient Position: Sitting, Cuff Size: Large)   Pulse 99   Temp 98.5 °F (36.9 °C) (Tympanic)   Ht 5' 7" (1.702 m)   Wt 58.5 kg (129 lb)   SpO2 100%   BMI 20.20 kg/m²     Physical Exam  Vitals and nursing note reviewed. Constitutional:       General: He is not in acute distress. Appearance: He is well-developed. HENT:      Head: Normocephalic and atraumatic. Eyes:      Conjunctiva/sclera: Conjunctivae normal.   Neck:      Vascular: No carotid bruit. Cardiovascular:      Rate and Rhythm: Normal rate and regular rhythm. Heart sounds: Normal heart sounds. No murmur heard. Pulmonary:      Effort: Pulmonary effort is normal. No respiratory distress. Breath sounds: Normal breath sounds. No wheezing or rales. Abdominal:      General: There is no distension. Palpations: Abdomen is soft. Tenderness: There is no abdominal tenderness. There is no right CVA tenderness or guarding. Musculoskeletal:      Right lower leg: No edema. Left lower leg: No edema. Lymphadenopathy:      Cervical: No cervical adenopathy. Skin:     General: Skin is warm and dry. Capillary Refill: Capillary refill takes less than 2 seconds. Neurological:      Mental Status: He is alert.    Psychiatric:         Mood and Affect: Mood normal.          Mya Adamson MD   [address] [facility]

## 2023-12-21 ENCOUNTER — OFFICE VISIT (OUTPATIENT)
Dept: URGENT CARE | Facility: CLINIC | Age: 23
End: 2023-12-21
Payer: COMMERCIAL

## 2023-12-21 VITALS
HEART RATE: 115 BPM | RESPIRATION RATE: 20 BRPM | HEIGHT: 68 IN | BODY MASS INDEX: 20.16 KG/M2 | SYSTOLIC BLOOD PRESSURE: 124 MMHG | DIASTOLIC BLOOD PRESSURE: 70 MMHG | WEIGHT: 133 LBS | OXYGEN SATURATION: 100 % | TEMPERATURE: 98.5 F

## 2023-12-21 DIAGNOSIS — J02.9 SORE THROAT: Primary | ICD-10-CM

## 2023-12-21 DIAGNOSIS — J35.8 TONSIL STONE: ICD-10-CM

## 2023-12-21 LAB — S PYO AG THROAT QL: NEGATIVE

## 2023-12-21 PROCEDURE — 87880 STREP A ASSAY W/OPTIC: CPT | Performed by: NURSE PRACTITIONER

## 2023-12-21 PROCEDURE — G0382 LEV 3 HOSP TYPE B ED VISIT: HCPCS | Performed by: NURSE PRACTITIONER

## 2023-12-22 NOTE — PROGRESS NOTES
"  [facility]        NAME: Connor J Nissen is a 23 y.o. male  : 2000    MRN: [id number]  DATE: 2023  TIME: 7:36 PM    Assessment and Plan   Sore throat [J02.9]  1. Sore throat  POCT rapid strepA        Rapid strep in office is negative.  Tonsil stone noted.  Patient states only has pain at the area of the \"white spot\".  Discussed diagnosis of tonsil stone.  Advised salt water gargles or Waterpik to assist in dislodging of the stone.  May follow-up with the ENT if becomes bothersome.  Patient agreement with plan.    Patient Instructions     Follow up with PCP in 3-5 days.  Proceed to  ER if symptoms worsen.    Chief Complaint     Chief Complaint   Patient presents with    Sore Throat     Pt C/O a sore throat with left tonsil swelling for about 2 weeks.          History of Present Illness   Connor J Nissen presents to the clinic c/o    Sore Throat (Pt C/O a sore throat with left tonsil swelling for about 2 weeks. )  Does gargle when he brushes his teeth however has not done salt water gargles or has tried to dislodge the area.  He states google told him is a tonsil stone but being that he does smoke on occasion wants to make sure is not cancer.  States feels like I have a chip or something stuck in that area        Review of Systems   Review of Systems   All other systems reviewed and are negative.        Current Medications     Long-Term Medications   Medication Sig Dispense Refill    loratadine (CLARITIN) 10 mg tablet Take 1 tablet by mouth daily      mometasone (NASONEX) 50 mcg/act nasal spray 2 sprays into each nostril daily (Patient not taking: Reported on 2022)      montelukast (SINGULAIR) 10 mg tablet Take 1 tablet (10 mg total) by mouth daily (Patient not taking: Reported on 2022) 30 tablet 3    sertraline (ZOLOFT) 25 mg tablet TAKE 1 TABLET BY MOUTH EVERY DAY (Patient not taking: Reported on 2022) 90 tablet 1       Current Allergies     Allergies as of 2023 - " "Reviewed 12/21/2023   Allergen Reaction Noted    Sulfa antibiotics Anaphylaxis, Hives, and Vomiting 07/02/2014    Pollen extract  07/02/2014            The following portions of the patient's history were reviewed and updated as appropriate: allergies, current medications, past family history, past medical history, past social history, past surgical history and problem list.    Objective   /70 (BP Location: Right arm, Patient Position: Sitting, Cuff Size: Standard)   Pulse (!) 115   Temp 98.5 °F (36.9 °C) (Tympanic)   Resp 20   Ht 5' 8\" (1.727 m)   Wt 60.3 kg (133 lb)   SpO2 100%   BMI 20.22 kg/m²        Physical Exam     Physical Exam  Vitals reviewed.   Constitutional:       Appearance: He is well-developed.   HENT:      Head: Normocephalic and atraumatic.      Right Ear: Hearing, tympanic membrane, ear canal and external ear normal.      Left Ear: Hearing, tympanic membrane, ear canal and external ear normal.      Nose: Nose normal.      Mouth/Throat:      Lips: Pink.      Mouth: Mucous membranes are moist.      Tonsils: 1+ on the right. 1+ on the left.      Comments: Tonsil stone on left  Eyes:      General: Lids are normal.      Conjunctiva/sclera: Conjunctivae normal.      Pupils: Pupils are equal, round, and reactive to light.   Cardiovascular:      Rate and Rhythm: Normal rate and regular rhythm.      Heart sounds: Normal heart sounds, S1 normal and S2 normal.   Pulmonary:      Effort: Pulmonary effort is normal.      Breath sounds: Normal breath sounds.   Musculoskeletal:      Cervical back: Full passive range of motion without pain, normal range of motion and neck supple.   Lymphadenopathy:      Cervical: No cervical adenopathy.   Skin:     General: Skin is warm and dry.   Neurological:      Mental Status: He is alert.   Psychiatric:         Speech: Speech normal.         Behavior: Behavior normal.         Thought Content: Thought content normal.         Judgment: Judgment normal. "

## 2023-12-27 ENCOUNTER — TELEPHONE (OUTPATIENT)
Dept: FAMILY MEDICINE CLINIC | Facility: CLINIC | Age: 23
End: 2023-12-27

## 2023-12-29 ENCOUNTER — OFFICE VISIT (OUTPATIENT)
Dept: FAMILY MEDICINE CLINIC | Facility: CLINIC | Age: 23
End: 2023-12-29
Payer: COMMERCIAL

## 2023-12-29 VITALS
BODY MASS INDEX: 19.85 KG/M2 | TEMPERATURE: 97.5 F | DIASTOLIC BLOOD PRESSURE: 64 MMHG | HEIGHT: 68 IN | SYSTOLIC BLOOD PRESSURE: 100 MMHG | RESPIRATION RATE: 16 BRPM | HEART RATE: 70 BPM | WEIGHT: 131 LBS | OXYGEN SATURATION: 98 %

## 2023-12-29 DIAGNOSIS — F41.9 ANXIETY AND DEPRESSION: Primary | ICD-10-CM

## 2023-12-29 DIAGNOSIS — F32.A ANXIETY AND DEPRESSION: Primary | ICD-10-CM

## 2023-12-29 PROCEDURE — 99213 OFFICE O/P EST LOW 20 MIN: CPT | Performed by: INTERNAL MEDICINE

## 2023-12-29 NOTE — PROGRESS NOTES
"Assessment/Plan:           Problem List Items Addressed This Visit    None  Visit Diagnoses       Anxiety and depression    -  Primary    Relevant Orders    Ambulatory referral to Psych Services              Subjective:      Patient ID: Connor J Nissen is a 23 y.o. male.    HPI  Patient is here accompanied by his father requesting a psychiatry consult.  Patient has been on Zoloft in the past but has not been taking so.  Lately he has been feeling more anxious difficulty with concentration which has been also noted by his family members as well.  Reports no hallucinations delusions or excess energy.  His father who accompanies him today does not mention any manic episodes.  Patient denies any suicidal thoughts.  He has a steady job.  Currently has not with a partner.  He drinks alcohol only occasionally and smokes marijuana but does not use any drugs.  His parents are involved in his care.    The following portions of the patient's history were reviewed and updated as appropriate: allergies, current medications, past family history, past medical history, past social history, and problem list.    Review of Systems      Objective:      /64   Pulse 70   Temp 97.5 °F (36.4 °C)   Resp 16   Ht 5' 8\" (1.727 m)   Wt 59.4 kg (131 lb)   SpO2 98%   BMI 19.92 kg/m²          Physical Exam  Neurological:      Mental Status: He is alert.   Psychiatric:         Attention and Perception: Attention normal.         Mood and Affect: Mood normal. Mood is not anxious or depressed. Affect is not angry, tearful or inappropriate.         Speech: Speech normal.         Behavior: Behavior is not agitated or aggressive. Behavior is cooperative.         Thought Content: Thought content is not paranoid or delusional.         Judgment: Judgment is not impulsive.           Depression Screening Follow-up Plan: Patient's depression screening was positive with a PHQ-2 score of 1. Their PHQ-9 score was . Patient assessed for underlying " major depression. They have no active suicidal ideations. Brief counseling provided and recommend additional follow-up/re-evaluation next office visit.  Psychiatry referral made

## 2024-01-16 ENCOUNTER — TELEPHONE (OUTPATIENT)
Dept: PSYCHIATRY | Facility: CLINIC | Age: 24
End: 2024-01-16

## 2024-01-16 NOTE — TELEPHONE ENCOUNTER
Called pt in regards to routine referral, verify needs of services and inform of current wait list. No answer. Writer not able to leave vm due to full vm box.  Closing referral due to no contact w/pt.

## 2024-01-29 ENCOUNTER — TELEPHONE (OUTPATIENT)
Age: 24
End: 2024-01-29

## 2024-01-29 ENCOUNTER — TELEPHONE (OUTPATIENT)
Dept: PSYCHIATRY | Facility: CLINIC | Age: 24
End: 2024-01-29

## 2024-01-29 NOTE — TELEPHONE ENCOUNTER
Pt called asking about how to get his referral to psych.  Discuss options.  Destiny, if going out of network we can fax the referral to where they want to go.

## 2024-01-29 NOTE — TELEPHONE ENCOUNTER
Patient has been added to the Medication Management and talk therapy wait list without a referral.    Insurance: [employer] PPO  Insurance Type:    Commercial [x]   Medicaid []   [facility] (if applicable)   Medicare []  Location Preference: No preference  Provider Preference: No provider preference  Virtual: Yes [] No [x]  Were outside resources sent: Yes [x] No []

## 2024-02-25 ENCOUNTER — OFFICE VISIT (OUTPATIENT)
Dept: URGENT CARE | Facility: MEDICAL CENTER | Age: 24
End: 2024-02-25
Payer: COMMERCIAL

## 2024-02-25 VITALS
TEMPERATURE: 98.4 F | HEART RATE: 98 BPM | DIASTOLIC BLOOD PRESSURE: 72 MMHG | OXYGEN SATURATION: 99 % | RESPIRATION RATE: 18 BRPM | SYSTOLIC BLOOD PRESSURE: 130 MMHG

## 2024-02-25 DIAGNOSIS — J39.2 THROAT IRRITATION: Primary | ICD-10-CM

## 2024-02-25 DIAGNOSIS — J02.9 SORE THROAT: ICD-10-CM

## 2024-02-25 LAB — S PYO AG THROAT QL: NEGATIVE

## 2024-02-25 PROCEDURE — 87880 STREP A ASSAY W/OPTIC: CPT | Performed by: FAMILY MEDICINE

## 2024-02-25 PROCEDURE — G0382 LEV 3 HOSP TYPE B ED VISIT: HCPCS | Performed by: FAMILY MEDICINE

## 2024-02-25 NOTE — PATIENT INSTRUCTIONS
Outpatient Mental Health Resources     [place] Suicide Prevention Lifeline: Dial #695  If you prefer to text, you can reach the Crisis Text Line by texting “[place]” to 295866    ¿Te encuentras en crisis? Envía un mensaje de texto con la palabra AYUDA al [phone] para comunicarte de manera gratuita con un Consejero de Crisis  Apoyo gratuito las 24 horas del día, los 7 días de la semana, al alcance de tu mano.    [facility] CRISIS for mental health emergencies and/or please go to your local Emergency Department Call [phone] if you or a loved one are in a mental health crisis.  You can Visit https://www.dhs.[place].gov/Services/Mental-Health-In-[place]/Documents/Suicide_Prevention_Hotlines.pdf to find 24/7 crisis phone line for other [facility].    [facility]   ? Location 1: [address] [place] [address] [phone]   ? Location 2: [address] [address] [phone]        ? English only* Serves ages 4+          ? Accepts Medicare and some commercial plans    [facility]   ? [address] [place] [address] [phone]; [phone]  ? Bilingual English/Belarusian Serves ages 5+   ? Accepts Medical Assistance     [facility]   ? [address] [place] [address] [phone]   ? Bilingual English/Belarusian Serves ages 14+   ? Accepts Medical Assistance, (Not currently accepting Medicare), & Major Commercial Insurances     [facility]   ? [address] [place] [address] [phone]        ? Bilingual English/Bel[place]ian Serves ages 6+   ? Accepts Medical Assistance & Commercial Insurance     [facility]   ? Previous [facility] location is closed  ? [address], [address] [phone]   ? Bilingual English/Belarusian Serves ages 3+   ? Accepts Medical Assistance & Some Commercial Insurance     [facility]   ? [address] [place] [address] [phone]   ? Bilingual English/[place]ian Serves ages 5+   ? Accepts Medical Assistance     [facility]   ? [address] [place] [address] [phone]  ? Bilingual English/Palauan Serves ages 3+   ? Accepts Medical Assistance & Some Commercial Insurance     PREVENTIVE MEASURES   ? Location 1: [address] [place] [address] [phone]        ? Location 2: [address] [place] [address]   ? Bilingual English/Palauan Serves ages 18+  ? Accepts Medical Assistance    [facility], [facility]  ? [address], [place] [address] [phone]  ? Bilingual English/Palauan  ? Accepts Aetna, Cigna, [employer]um/[employer] [facility], [facility], [employer], Medicare, [employer]/[facility], [facility]. No Medical Assistance    [facility] ([phone])  Medicare/Medicare Advantage, Medical Assistance/HealthChoices, Commercial, and self-pay as payment.    TEEN HELP LINE  ? 9-339-513-TALK    CRIME VICTIMS [facility]       ? [phone]       ? 24/7 [facility] Hotline    [facility]       ? [phone]       ? 24/7 [facility] Hotline    www.[employer] is a resource to find psychotherapy providers, patients can filter therapist search list based on a number of criteria including language, specialty, gender, insurance, etc. Individuals seeking will need to reach out to perspective providers through information in the directory. You are encouraged to contact multiple providers to given that many providers have a significant wait list for services as well as to find a provider is a good fit for you!    [facility] is an organization of families, friends and individuals whose lives have been affected by mental illness. Together, we advocate for better lives for those individuals who have a m)ental illness. Visit: [facility].org to learn more or to search for local support resources including qualified mental health providers.

## 2024-02-25 NOTE — PROGRESS NOTES
[facility]        NAME: Connor J Nissen is a 23 y.o. male  : 2000    MRN: [id number]  DATE: 2024  TIME: 3:36 PM    Assessment and Plan   Throat irritation [J39.2]  1. Throat irritation        2. Sore throat  POCT rapid strep A        Advised pt to try to mask at work ([employer])  Encouraged pt regarding his smoking cessation  Advised pt to quit Nicotine pouches.    Patient Instructions     Follow up with PCP in 3-5 days if symptoms worsen.  Advised pt to follow up with PCP or mental Health for Anxiety management.    Chief Complaint     Chief Complaint   Patient presents with   • Swollen Glands     Patient c/o swollen glands and has a feeling of something stuck in his throat x 2 weeks      History of Present Illness   HPI  Connor J Nissen is a 23 y.o. male  who [facility] with two weeks h/o swollen glands in his neck/under his chin.  He feels like there is a lump in the back of his tongue.   He started a new job 2-3 week ago as , and there is constant smoke going into his nose and mouth.    Pt uses [employer] Cool Mint Nicotine pouches which he has used for the past 2 years.  Pt used to smoke cig since age 17 but has stopped smoking about one month ago.    He does not have nasal congestion, sore throat, ear pain, nausea, vomiting, cough, dizziness, headaches, chest pain, shortness of breath.    Review of Systems   Review of Systems   Constitutional:  Negative for chills and fever.   HENT:  Positive for sore throat. Negative for congestion and rhinorrhea.    Respiratory:  Negative for cough and shortness of breath.    Cardiovascular:  Negative for chest pain.   Gastrointestinal:  Negative for diarrhea, nausea and vomiting.   Skin:  Negative for rash.   Neurological:  Negative for dizziness and headaches.   Psychiatric/Behavioral:  The patient is nervous/anxious.      Current Medications       Current Outpatient Medications:   •  albuterol (PROAIR HFA) 90  mcg/act inhaler, Inhale 2 puffs 4 (four) times a day (Patient not taking: Reported on 12/21/2023), Disp: 1 Inhaler, Rfl: 3  •  loratadine (CLARITIN) 10 mg tablet, Take 1 tablet by mouth daily (Patient not taking: Reported on 12/29/2023), Disp: , Rfl:   •  mometasone (NASONEX) 50 mcg/act nasal spray, 2 sprays into each nostril daily (Patient not taking: Reported on 8/22/2022), Disp: , Rfl:   •  montelukast (SINGULAIR) 10 mg tablet, Take 1 tablet (10 mg total) by mouth daily (Patient not taking: Reported on 8/22/2022), Disp: 30 tablet, Rfl: 3  •  sertraline (ZOLOFT) 25 mg tablet, TAKE 1 TABLET BY MOUTH EVERY DAY (Patient not taking: Reported on 8/22/2022), Disp: 90 tablet, Rfl: 1    Current Allergies     Allergies as of 02/25/2024 - Reviewed 12/29/2023   Allergen Reaction Noted   • Sulfa antibiotics Anaphylaxis, Hives, and Vomiting 07/02/2014   • Pollen extract  07/02/2014          The following portions of the patient's history were reviewed and updated as appropriate: allergies, current medications, past family history, past medical history, past social history, past surgical history and problem list.     Past Medical History:   Diagnosis Date   • ADHD (attention deficit hyperactivity disorder)    • Allergic      No past surgical history on file.    Family History   Problem Relation Age of Onset   • Diabetes Mother    • Kidney failure Mother    • Blindness Mother    • Depression Mother    • No Known Problems Father    • Depression Maternal Grandmother    • Substance Abuse Neg Hx      Medications have been verified.    Objective   /72   Pulse 98   Temp 98.4 °F (36.9 °C)   Resp 18   SpO2 99%   No LMP for male patient.       Physical Exam     Physical Exam  Vitals and nursing note reviewed.   Constitutional:       Appearance: He is well-developed.   HENT:      Head: Normocephalic and atraumatic.      Right Ear: Tympanic membrane, ear canal and external ear normal.      Left Ear: Tympanic membrane, ear canal  and external ear normal.      Nose: Nose normal.      Mouth/Throat:      Mouth: Mucous membranes are moist.      Pharynx: Posterior oropharyngeal erythema present.      Comments: B/l tonsillar hypertrophy, L>R with erythema  Eyes:      Extraocular Movements: Extraocular movements intact.      Conjunctiva/sclera: Conjunctivae normal.   Cardiovascular:      Rate and Rhythm: Regular rhythm. Tachycardia present.      Heart sounds: Normal heart sounds.   Pulmonary:      Effort: Pulmonary effort is normal. No respiratory distress.      Breath sounds: Normal breath sounds.   Neurological:      Mental Status: He is alert and oriented to person, place, and time.   Psychiatric:         Mood and Affect: Mood is anxious.         Behavior: Behavior is cooperative.

## 2024-03-04 ENCOUNTER — TELEPHONE (OUTPATIENT)
Age: 24
End: 2024-03-04

## 2024-03-04 NOTE — TELEPHONE ENCOUNTER
Patient had labs completed at  this weekend and would like Dr. Ledbetter to review them. Lab results in Care Everywhere tab.

## 2024-03-05 ENCOUNTER — OFFICE VISIT (OUTPATIENT)
Dept: FAMILY MEDICINE CLINIC | Facility: CLINIC | Age: 24
End: 2024-03-05
Payer: COMMERCIAL

## 2024-03-05 VITALS
SYSTOLIC BLOOD PRESSURE: 120 MMHG | HEART RATE: 100 BPM | BODY MASS INDEX: 21.03 KG/M2 | HEIGHT: 67 IN | WEIGHT: 134 LBS | OXYGEN SATURATION: 97 % | DIASTOLIC BLOOD PRESSURE: 70 MMHG | TEMPERATURE: 100.1 F

## 2024-03-05 DIAGNOSIS — R50.9 FEVER, UNSPECIFIED FEVER CAUSE: ICD-10-CM

## 2024-03-05 DIAGNOSIS — R05.1 ACUTE COUGH: ICD-10-CM

## 2024-03-05 DIAGNOSIS — R53.83 FATIGUE, UNSPECIFIED TYPE: ICD-10-CM

## 2024-03-05 DIAGNOSIS — R22.1 NECK MASS: ICD-10-CM

## 2024-03-05 PROCEDURE — 99214 OFFICE O/P EST MOD 30 MIN: CPT | Performed by: PHYSICIAN ASSISTANT

## 2024-03-05 PROCEDURE — 87070 CULTURE OTHR SPECIMN AEROBIC: CPT | Performed by: PHYSICIAN ASSISTANT

## 2024-03-05 PROCEDURE — 87636 SARSCOV2 & INF A&B AMP PRB: CPT | Performed by: PHYSICIAN ASSISTANT

## 2024-03-05 NOTE — PROGRESS NOTES
Name: Connor J Nissen      : 2000      MRN: [id number]  Encounter Provider: Dianna Dozier PA-C  Encounter Date: 3/5/2024   Encounter department: [facility]    Assessment & Plan     1. Acute cough  -     US head neck soft tissue; Future; Expected date: 2024  -     Mononucleosis screen; Future  -     EBV acute panel; Future  -     XR chest pa & lateral; Future; Expected date: 2024    2. Neck mass  -     US head neck soft tissue; Future; Expected date: 2024  -     Mononucleosis screen; Future  -     EBV acute panel; Future  -     XR chest pa & lateral; Future; Expected date: 2024    3. Fever, unspecified fever cause  -     US head neck soft tissue; Future; Expected date: 2024  -     Mononucleosis screen; Future  -     EBV acute panel; Future  -     XR chest pa & lateral; Future; Expected date: 2024    4. Fatigue, unspecified type  -     US head neck soft tissue; Future; Expected date: 2024  -     Mononucleosis screen; Future  -     EBV acute panel; Future  -     XR chest pa & lateral; Future; Expected date: 2024    -The urgent care notes from  and  have been reviewed  - He did have recent blood work done on  which was routine labs ordered by Dr. Ledbetter.  He does have a mild increase in his platelets.  White cell count is normal.  Liver enzymes and renal function is normal.  He does have a low vitamin D3 level so I did recommend 2000 international units daily lifelong.  - I did recommend an Diego-Barr virus and Monospot  - Throat culture performed today to send to laboratory  - Ultrasound soft tissue of his neck  - Monitor temperature at home.  Take Tylenol or Advil as needed  - Avoid any impact to abdomen  -Avoid sharing any beverages, utensils.  Avoid kissing anybody  - COVID/flu PCR swab done here in the office today.  Advised that it will take 24 to 48 hours to get those results  -Increase clear liquids  -  Appetite as tolerated  - I did recommend he follow-up with Dr. Ledbetter next week.  Sooner if any symptoms increase    M*Modal software was used to dictate this note. It may contain errors with dictating incorrect words/spelling. Please contact provider directly for any questions.          Subjective      Patient presents today with mom for evaluation of a lump below his left jaw.  He denies any pain.  He states that he was having some throat pain but does not notice much pain at this time.  He states he has been coughing up some dark mucus.  He has not had a known fever and did not realize that his temperature was up a little bit today at 100.1 at the time of this evaluation.  He has not had any chills.  He states recently he was exposed to a friend's brother who tested positive for mono.  He denies any abdominal pain, nausea, vomiting, nasal congestion, ear pain, diarrhea.  No current treatment.  He states that he was smoking a lot of marijuana and he has cut back quite a bit.  He was also smoking 1/2 pack of cigarettes daily which she is not doing much at all right now.  He denies any changes in his weight.  He has been working.    He did go to [facility] urgent care on 2/19 and he states essentially was told it was allergies.  He then went to [facility] on 2/25 and was given a rapid strep test which came back negative.  The final culture was not sent to the laboratory.  He states he was told it was more viral and no specific medication treatment was given at that time.      Review of Systems   Constitutional:  Positive for fatigue. Negative for chills and fever.   HENT:  Positive for sore throat (As stated in HPI). Negative for congestion, ear pain, postnasal drip, rhinorrhea and sinus pressure.        Current Outpatient Medications on File Prior to Visit   Medication Sig    albuterol (PROAIR HFA) 90 mcg/act inhaler Inhale 2 puffs 4 (four) times a day (Patient not taking: Reported on 12/21/2023)     "loratadine (CLARITIN) 10 mg tablet Take 1 tablet by mouth daily (Patient not taking: Reported on 12/29/2023)    mometasone (NASONEX) 50 mcg/act nasal spray 2 sprays into each nostril daily (Patient not taking: Reported on 8/22/2022)    montelukast (SINGULAIR) 10 mg tablet Take 1 tablet (10 mg total) by mouth daily (Patient not taking: Reported on 8/22/2022)    sertraline (ZOLOFT) 25 mg tablet TAKE 1 TABLET BY MOUTH EVERY DAY (Patient not taking: Reported on 8/22/2022)       Objective     /70   Pulse 100   Temp 100.1 °F (37.8 °C)   Ht 5' 7\" (1.702 m)   Wt 60.8 kg (134 lb)   SpO2 97%   BMI 20.99 kg/m²     Physical Exam  Vitals reviewed.   Constitutional:       General: He is not in acute distress.     Appearance: Normal appearance. He is not ill-appearing, toxic-appearing or diaphoretic.   HENT:      Head: Normocephalic and atraumatic.      Right Ear: Tympanic membrane normal. There is no impacted cerumen.      Left Ear: Tympanic membrane normal. There is no impacted cerumen.      Mouth/Throat:      Mouth: Mucous membranes are moist.      Pharynx: No oropharyngeal exudate.   Neck:      Comments: There is a 1 and half centimeter palpable mass below the left side of his jaw.  Nontender.  Cardiovascular:      Rate and Rhythm: Normal rate and regular rhythm.      Heart sounds: Normal heart sounds. No murmur heard.  Pulmonary:      Effort: Pulmonary effort is normal. No respiratory distress.      Breath sounds: Normal breath sounds. No wheezing, rhonchi or rales.   Abdominal:      General: Abdomen is flat. Bowel sounds are normal.      Palpations: Abdomen is soft.      Tenderness: There is no abdominal tenderness.   Musculoskeletal:      Cervical back: Neck supple.   Skin:     General: Skin is warm.   Neurological:      General: No focal deficit present.      Mental Status: He is alert.   Psychiatric:         Mood and Affect: Mood normal.         Behavior: Behavior normal.         Thought Content: Thought " content normal.         Judgment: Judgment normal.       Dianna Dozier PA-C

## 2024-03-06 LAB
FLUAV RNA RESP QL NAA+PROBE: NEGATIVE
FLUBV RNA RESP QL NAA+PROBE: NEGATIVE
SARS-COV-2 RNA RESP QL NAA+PROBE: NEGATIVE

## 2024-03-07 ENCOUNTER — TELEPHONE (OUTPATIENT)
Dept: FAMILY MEDICINE CLINIC | Facility: CLINIC | Age: 24
End: 2024-03-07

## 2024-03-07 NOTE — TELEPHONE ENCOUNTER
----- Message from Dianna Dozier PA-C sent at 3/7/2024 12:48 PM EST -----  Please let him know that his throat culture and COVID/flu test did come up negative.  Thank you

## 2024-03-08 ENCOUNTER — HOSPITAL ENCOUNTER (OUTPATIENT)
Dept: ULTRASOUND IMAGING | Facility: HOSPITAL | Age: 24
End: 2024-03-08
Payer: COMMERCIAL

## 2024-03-08 DIAGNOSIS — R50.9 FEVER, UNSPECIFIED FEVER CAUSE: ICD-10-CM

## 2024-03-08 DIAGNOSIS — R05.1 ACUTE COUGH: ICD-10-CM

## 2024-03-08 DIAGNOSIS — R53.83 FATIGUE, UNSPECIFIED TYPE: ICD-10-CM

## 2024-03-08 DIAGNOSIS — R22.1 NECK MASS: ICD-10-CM

## 2024-03-08 LAB — BACTERIA THROAT CULT: NORMAL

## 2024-03-08 PROCEDURE — 76536 US EXAM OF HEAD AND NECK: CPT

## 2024-03-08 NOTE — LETTER
[facility]  [address] [place] [address]      March 18, 2024    MRN: [id number]     Phone: [phone]     Dear Mr. Ardonen,    You recently had a(n) Ultrasound performed on 3/8/2024 at  [facility] that was requested by Dianna Dozier PA-C. The study was reviewed by a radiologist, which is a physician who specializes in medical imaging. The radiologist issued a report describing his or her findings. In that report there was a finding that the radiologist felt warranted further discussion with your health care provider and that discussion would be beneficial to you.      The results were sent to Dianna Dozier PA-C on 03/12/2024 11:39 AM. We recommend that you contact Dianna Dozier PA-C at [phone] or set up an appointment to discuss the results of the imaging test. If you have already heard from Dianna Dozier PA-C regarding the results of your study, you can disregard this letter.     This letter is not meant to alarm you, but intended to encourage you to follow-up on your results with the provider that sent you for the imaging study. In addition, we have enclosed answers to frequently asked questions by other patients who have also received a letter to review results with their health care provider (see page two).      Thank you for choosing [facility] for your medical imaging needs.                                                                                                                                                        FREQUENTLY ASKED QUESTIONS    Why am I receiving this letter?  [place] State Law requires us to notify patients who have findings on imaging exams that may require more testing or follow-up with a health professional within the next 3 months.        How serious is the finding on the imaging test?  This letter is sent to all patients who may need follow-up or more testing within the next 3 months.   Receiving this letter does not necessarily mean you have a life-threatening imaging finding or disease.  Recommendations in the radiologist’s imaging report are general in nature and it is up to your healthcare provider to say whether those recommendations make sense for your situation.  You are strongly encouraged to talk to your health care provider about the results and ask whether additional steps need to be taken.    Where can I get a copy of the final report for my recent radiology exam?  To get a full copy of the report you can access your records online at [email or web address] or please contact [facility]’s Medical Records Department at [phone] Monday through Friday between 8 am and 6 pm.         What do I need to do now?           Please contact your health care provider who requested the imaging study to discuss what further actions (if any) are needed.  You may have already heard from (your ordering provider) in regard to this test in which case you can disregard this letter.        NOTICE IN ACCORDANCE WITH THE [place] STATE “PATIENT TEST RESULT INFORMATION ACT OF 2018”    You are receiving this notice as a result of a determination by your diagnostic imaging service that further discussions of your test results are warranted and would be beneficial to you.    The complete results of your test or tests have been or will be sent to the health care practitioner that ordered the test or tests. It is recommended that you contact your health care practitioner to discuss your results as soon as possible.

## 2024-03-10 LAB — HETEROPH AB SERPLBLD QL IA.RAPID: NEGATIVE

## 2024-03-12 LAB
EBV EA IGG SER QL IF: <5 U/ML
EBV NA IGG SER IA-ACNC: 43.4 U/ML
EBV VCA IGG SER IA-ACNC: 410 U/ML
EBV VCA IGM SER IA-ACNC: <10 U/ML
IMP & REVIEW OF LAB RESULTS: ABNORMAL

## 2024-03-12 NOTE — RESULT ENCOUNTER NOTE
Please let him know that the ultrasound of his neck does reveal inflamed lymph nodes.  We need to monitor those lymph nodes.  Please advise him to follow-up with Dr. Ledbetter as scheduled on March 21.  Thank you

## 2024-03-12 NOTE — RESULT ENCOUNTER NOTE
Please let him know that his monotest came back negative.  His Diego-Barr virus did come back indicating past infection.  He should follow-up with Dr. Ledbetter as scheduled this month.  Thanks

## 2024-03-21 ENCOUNTER — OFFICE VISIT (OUTPATIENT)
Dept: FAMILY MEDICINE CLINIC | Facility: CLINIC | Age: 24
End: 2024-03-21
Payer: COMMERCIAL

## 2024-03-21 VITALS
HEART RATE: 89 BPM | SYSTOLIC BLOOD PRESSURE: 120 MMHG | TEMPERATURE: 98.6 F | DIASTOLIC BLOOD PRESSURE: 76 MMHG | OXYGEN SATURATION: 99 % | WEIGHT: 137.6 LBS | HEIGHT: 67 IN | BODY MASS INDEX: 21.6 KG/M2 | RESPIRATION RATE: 16 BRPM

## 2024-03-21 DIAGNOSIS — R60.9 SWELLING: ICD-10-CM

## 2024-03-21 DIAGNOSIS — R59.0 LYMPHADENOPATHY, CERVICAL: Primary | ICD-10-CM

## 2024-03-21 DIAGNOSIS — M25.441 FINGER JOINT SWELLING, RIGHT: ICD-10-CM

## 2024-03-21 DIAGNOSIS — T14.90XA INJURY: ICD-10-CM

## 2024-03-21 DIAGNOSIS — J06.9 RECURRENT URI (UPPER RESPIRATORY INFECTION): ICD-10-CM

## 2024-03-21 PROCEDURE — 99214 OFFICE O/P EST MOD 30 MIN: CPT | Performed by: INTERNAL MEDICINE

## 2024-03-21 RX ORDER — CEPHALEXIN 500 MG/1
500 CAPSULE ORAL EVERY 8 HOURS SCHEDULED
Qty: 15 CAPSULE | Refills: 0 | Status: SHIPPED | OUTPATIENT
Start: 2024-03-21 | End: 2024-03-26

## 2024-03-21 NOTE — PROGRESS NOTES
Assessment/Plan:           Problem List Items Addressed This Visit    None  Visit Diagnoses       Lymphadenopathy, cervical    -  Primary    Relevant Orders    US head neck soft tissue    CBC and differential    Comprehensive metabolic panel    HIV 1/2 AG/AB w Reflex [facility] for 2 yr old and above    Leukemia/Lymphoma flow cytometry    IgG, IgA, IgM    Recurrent URI (upper respiratory infection)        Relevant Medications    cephalexin (KEFLEX) 500 mg capsule    Other Relevant Orders    CBC and differential    Comprehensive metabolic panel    HIV 1/2 AG/AB w Reflex [facility] for 2 yr old and above    Leukemia/Lymphoma flow cytometry    IgG, IgA, IgM    Injury        Relevant Medications    cephalexin (KEFLEX) 500 mg capsule    Other Relevant Orders    XR finger right second digit-index    Swelling        Relevant Orders    XR finger right second digit-index    Finger joint swelling, right        Relevant Medications    cephalexin (KEFLEX) 500 mg capsule              Subjective:      Patient ID: Connor J Nissen is a 23 y.o. male.    HPI  Patient is here to follow-up on lymphadenopathy.  He was seen by my partner 3/ 8 with enlarged lymph nodes with a URI.  Monospot test was negative.  EBV serology came back positive for past infection.  COVID flu and RSV was negative.  Throat culture was negative.  Head and neck ultrasound showed prominent lymph nodes bilaterally.   Patient reports the lymph nodes have decreased in size.  He does not feel any irritation in his throat.  He reports no fever cough weight loss night sweats.    I reviewed patient's record he had been seen in urgent care in December with URI, [facility] in February and the last 138.  I would also check his immunoglobulins given family history of type 1 diabetes.  I will also check for HIV.  He has been checked in the past which was negative.   He tells me his right index finger is a slightly swollen.  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  His  "last DTaP was 1123.       The following portions of the patient's history were reviewed and updated as appropriate: allergies, current medications, past family history, past medical history, past social history, past surgical history, and problem list.    Review of Systems      Objective:      /76 (BP Location: Left arm, Patient Position: Sitting, Cuff Size: Standard)   Pulse 89   Temp 98.6 °F (37 °C) (Tympanic)   Resp 16   Ht 5' 7\" (1.702 m)   Wt 62.4 kg (137 lb 9.6 oz)   SpO2 99%   BMI 21.55 kg/m²          Physical Exam  Abdominal:      General: There is no distension.      Tenderness: There is no abdominal tenderness.   Musculoskeletal:         General: Swelling (Right index finger swelling middle phalanx able to bend the finger) and deformity present. No tenderness.   Lymphadenopathy:      Head:      Right side of head: Submandibular (Minimal reactive bilateral lymph nodes) adenopathy present.      Left side of head: Submandibular adenopathy present.      Cervical:      Right cervical: No posterior cervical adenopathy.     Left cervical: No posterior cervical adenopathy.      Upper Body:      Right upper body: No supraclavicular or axillary adenopathy.      Left upper body: No supraclavicular or axillary adenopathy.   Neurological:      Mental Status: He is alert.                 "

## 2024-03-23 LAB
ALBUMIN SERPL-MCNC: 4.7 G/DL (ref 3.5–5.7)
ALP SERPL-CCNC: 45 U/L (ref 35–120)
ALT SERPL-CCNC: 25 U/L
ANION GAP SERPL CALCULATED.3IONS-SCNC: 8 MMOL/L (ref 3–11)
AST SERPL-CCNC: 20 U/L
BASOPHILS # BLD AUTO: 0 THOU/CMM (ref 0–0.1)
BASOPHILS NFR BLD AUTO: 1 %
BILIRUB SERPL-MCNC: 0.5 MG/DL (ref 0.2–1)
BUN SERPL-MCNC: 20 MG/DL (ref 7–28)
CALCIUM SERPL-MCNC: 9.7 MG/DL (ref 8.5–10.1)
CHLORIDE SERPL-SCNC: 104 MMOL/L (ref 100–109)
CO2 SERPL-SCNC: 30 MMOL/L (ref 21–31)
CREAT SERPL-MCNC: 0.93 MG/DL (ref 0.53–1.3)
CYTOLOGY CMNT CVX/VAG CYTO-IMP: NORMAL
DIFFERENTIAL METHOD BLD: ABNORMAL
EOSINOPHIL # BLD AUTO: 0.5 THOU/CMM (ref 0–0.5)
EOSINOPHIL NFR BLD AUTO: 6 %
ERYTHROCYTE [DISTWIDTH] IN BLOOD BY AUTOMATED COUNT: 12.5 % (ref 12–16)
GFR/BSA.PRED SERPLBLD CYS-BASED-ARV: 119 ML/MIN/{1.73_M2}
GLUCOSE SERPL-MCNC: 75 MG/DL (ref 65–99)
HCT VFR BLD AUTO: 44.2 % (ref 37–48)
HGB BLD-MCNC: 14.9 G/DL (ref 12.5–17)
HIV 1+2 AB+HIV1 P24 AG SERPL QL IA: NONREACTIVE
LYMPHOCYTES # BLD AUTO: 1.8 THOU/CMM (ref 1–3)
LYMPHOCYTES NFR BLD AUTO: 22 %
MCH RBC QN AUTO: 30.9 PG (ref 27–36)
MCHC RBC AUTO-ENTMCNC: 33.7 G/DL (ref 32–37)
MCV RBC AUTO: 91 FL (ref 80–100)
MONOCYTES # BLD AUTO: 0.8 THOU/CMM (ref 0.3–1)
MONOCYTES NFR BLD AUTO: 9 %
NEUTROPHILS # BLD AUTO: 5 THOU/CMM (ref 1.8–7.8)
NEUTROPHILS NFR BLD AUTO: 62 %
PLATELET # BLD AUTO: 395 THOU/CMM (ref 140–350)
PMV BLD REES-ECKER: 6.4 FL (ref 7.5–11.3)
POTASSIUM SERPL-SCNC: 4.5 MMOL/L (ref 3.5–5.2)
PROT SERPL-MCNC: 7.1 G/DL (ref 6.3–8.3)
RBC # BLD AUTO: 4.84 MILL/CMM (ref 4–5.4)
SODIUM SERPL-SCNC: 142 MMOL/L (ref 135–145)
WBC # BLD AUTO: 8.1 THOU/CMM (ref 4–10.5)

## 2024-03-25 LAB
IGA SERPL-MCNC: 454 MG/DL (ref 71–365)
IGG SERPL-MCNC: 634 MG/DL (ref 680–1445)
IGM SERPL-MCNC: 80 MG/DL (ref 40–248)

## 2024-03-26 ENCOUNTER — TELEPHONE (OUTPATIENT)
Age: 24
End: 2024-03-26

## 2024-03-26 ENCOUNTER — HOSPITAL ENCOUNTER (OUTPATIENT)
Dept: ULTRASOUND IMAGING | Facility: HOSPITAL | Age: 24
Discharge: HOME | End: 2024-03-26
Payer: COMMERCIAL

## 2024-03-26 DIAGNOSIS — R59.0 LYMPHADENOPATHY, CERVICAL: ICD-10-CM

## 2024-03-26 PROCEDURE — 76536 US EXAM OF HEAD AND NECK: CPT

## 2024-03-26 NOTE — TELEPHONE ENCOUNTER
Patient called in regarding his recent blood work. Please call patient after Dr. Ledbetter reviews.

## 2024-03-27 DIAGNOSIS — R76.8 LOW IMMUNOGLOBULIN LEVEL: Primary | ICD-10-CM

## 2024-03-28 NOTE — TELEPHONE ENCOUNTER
Mom is aware of the below results, and was advised that we are still waiting on results from [facility] and the US results are still pending and that she will be contacted once they become available. Mom is requesting for her to be called since the patient does not get good cell service at work.

## 2024-11-13 ENCOUNTER — TELEPHONE (OUTPATIENT)
Age: 24
End: 2024-11-13

## 2024-11-13 NOTE — TELEPHONE ENCOUNTER
Reached out to pt in regards to med mgmt wait list. Pt stated they were interested in services. Informed pt that intake would need to be completed. Writer asked first question and pt ended call.

## 2025-05-28 ENCOUNTER — TELEPHONE (OUTPATIENT)
Age: 25
End: 2025-05-28